# Patient Record
Sex: MALE | Race: BLACK OR AFRICAN AMERICAN | Employment: OTHER | URBAN - METROPOLITAN AREA
[De-identification: names, ages, dates, MRNs, and addresses within clinical notes are randomized per-mention and may not be internally consistent; named-entity substitution may affect disease eponyms.]

---

## 2021-07-30 ENCOUNTER — APPOINTMENT (OUTPATIENT)
Dept: GENERAL RADIOLOGY | Age: 71
End: 2021-07-30
Attending: EMERGENCY MEDICINE
Payer: OTHER GOVERNMENT

## 2021-07-30 ENCOUNTER — HOSPITAL ENCOUNTER (EMERGENCY)
Age: 71
Discharge: HOME | End: 2021-07-30
Attending: EMERGENCY MEDICINE
Payer: OTHER GOVERNMENT

## 2021-07-30 VITALS
BODY MASS INDEX: 24.52 KG/M2 | HEIGHT: 73 IN | TEMPERATURE: 98 F | RESPIRATION RATE: 18 BRPM | WEIGHT: 185 LBS | SYSTOLIC BLOOD PRESSURE: 152 MMHG | DIASTOLIC BLOOD PRESSURE: 85 MMHG | OXYGEN SATURATION: 98 % | HEART RATE: 59 BPM

## 2021-07-30 DIAGNOSIS — R07.9 CHEST PAIN, UNSPECIFIED TYPE: Primary | ICD-10-CM

## 2021-07-30 DIAGNOSIS — R25.2 MUSCLE CRAMPS: ICD-10-CM

## 2021-07-30 LAB
ALBUMIN SERPL-MCNC: 3.4 G/DL (ref 3.2–4.6)
ALBUMIN/GLOB SERPL: 0.9 {RATIO} (ref 1.2–3.5)
ALP SERPL-CCNC: 70 U/L (ref 50–136)
ALT SERPL-CCNC: 18 U/L (ref 12–65)
ANION GAP SERPL CALC-SCNC: 3 MMOL/L (ref 7–16)
AST SERPL-CCNC: 17 U/L (ref 15–37)
ATRIAL RATE: 73 BPM
BASOPHILS # BLD: 0 K/UL (ref 0–0.2)
BASOPHILS NFR BLD: 1 % (ref 0–2)
BILIRUB SERPL-MCNC: 0.5 MG/DL (ref 0.2–1.1)
BUN SERPL-MCNC: 24 MG/DL (ref 8–23)
CALCIUM SERPL-MCNC: 8.4 MG/DL (ref 8.3–10.4)
CALCULATED P AXIS, ECG09: 66 DEGREES
CALCULATED R AXIS, ECG10: -20 DEGREES
CALCULATED T AXIS, ECG11: 32 DEGREES
CHLORIDE SERPL-SCNC: 112 MMOL/L (ref 98–107)
CO2 SERPL-SCNC: 30 MMOL/L (ref 21–32)
CREAT SERPL-MCNC: 1.37 MG/DL (ref 0.8–1.5)
DIAGNOSIS, 93000: NORMAL
DIFFERENTIAL METHOD BLD: ABNORMAL
EOSINOPHIL # BLD: 0.1 K/UL (ref 0–0.8)
EOSINOPHIL NFR BLD: 1 % (ref 0.5–7.8)
ERYTHROCYTE [DISTWIDTH] IN BLOOD BY AUTOMATED COUNT: 14.5 % (ref 11.9–14.6)
GLOBULIN SER CALC-MCNC: 3.6 G/DL (ref 2.3–3.5)
GLUCOSE SERPL-MCNC: 80 MG/DL (ref 65–100)
HCT VFR BLD AUTO: 39.8 % (ref 41.1–50.3)
HGB BLD-MCNC: 12.7 G/DL (ref 13.6–17.2)
IMM GRANULOCYTES # BLD AUTO: 0 K/UL (ref 0–0.5)
IMM GRANULOCYTES NFR BLD AUTO: 0 % (ref 0–5)
LIPASE SERPL-CCNC: 120 U/L (ref 73–393)
LYMPHOCYTES # BLD: 2.1 K/UL (ref 0.5–4.6)
LYMPHOCYTES NFR BLD: 26 % (ref 13–44)
MAGNESIUM SERPL-MCNC: 2.6 MG/DL (ref 1.8–2.4)
MCH RBC QN AUTO: 29.3 PG (ref 26.1–32.9)
MCHC RBC AUTO-ENTMCNC: 31.9 G/DL (ref 31.4–35)
MCV RBC AUTO: 91.9 FL (ref 79.6–97.8)
MONOCYTES # BLD: 0.9 K/UL (ref 0.1–1.3)
MONOCYTES NFR BLD: 11 % (ref 4–12)
NEUTS SEG # BLD: 4.9 K/UL (ref 1.7–8.2)
NEUTS SEG NFR BLD: 62 % (ref 43–78)
NRBC # BLD: 0 K/UL (ref 0–0.2)
P-R INTERVAL, ECG05: 188 MS
PLATELET # BLD AUTO: 165 K/UL (ref 150–450)
PMV BLD AUTO: 11.7 FL (ref 9.4–12.3)
POTASSIUM SERPL-SCNC: 3.9 MMOL/L (ref 3.5–5.1)
PROT SERPL-MCNC: 7 G/DL (ref 6.3–8.2)
Q-T INTERVAL, ECG07: 394 MS
QRS DURATION, ECG06: 96 MS
QTC CALCULATION (BEZET), ECG08: 434 MS
RBC # BLD AUTO: 4.33 M/UL (ref 4.23–5.6)
SODIUM SERPL-SCNC: 145 MMOL/L (ref 136–145)
TROPONIN-HIGH SENSITIVITY: 6.8 PG/ML (ref 0–14)
TROPONIN-HIGH SENSITIVITY: 7 PG/ML (ref 0–14)
VENTRICULAR RATE, ECG03: 73 BPM
WBC # BLD AUTO: 7.9 K/UL (ref 4.3–11.1)

## 2021-07-30 PROCEDURE — 93005 ELECTROCARDIOGRAM TRACING: CPT | Performed by: EMERGENCY MEDICINE

## 2021-07-30 PROCEDURE — 83690 ASSAY OF LIPASE: CPT

## 2021-07-30 PROCEDURE — 71046 X-RAY EXAM CHEST 2 VIEWS: CPT

## 2021-07-30 PROCEDURE — 99284 EMERGENCY DEPT VISIT MOD MDM: CPT

## 2021-07-30 PROCEDURE — 83735 ASSAY OF MAGNESIUM: CPT

## 2021-07-30 PROCEDURE — 85025 COMPLETE CBC W/AUTO DIFF WBC: CPT

## 2021-07-30 PROCEDURE — 84484 ASSAY OF TROPONIN QUANT: CPT

## 2021-07-30 PROCEDURE — 80053 COMPREHEN METABOLIC PANEL: CPT

## 2021-07-30 RX ORDER — SODIUM CHLORIDE 0.9 % (FLUSH) 0.9 %
5-10 SYRINGE (ML) INJECTION EVERY 8 HOURS
Status: DISCONTINUED | OUTPATIENT
Start: 2021-07-30 | End: 2021-07-30 | Stop reason: HOSPADM

## 2021-07-30 RX ORDER — SODIUM CHLORIDE 0.9 % (FLUSH) 0.9 %
5-10 SYRINGE (ML) INJECTION AS NEEDED
Status: DISCONTINUED | OUTPATIENT
Start: 2021-07-30 | End: 2021-07-30 | Stop reason: HOSPADM

## 2021-07-30 NOTE — ED PROVIDER NOTES
49-year-old -American male with no underlying cardiac disease had a brief episode of chest pain yesterday morning. Discomfort lasted less than 2 minutes. He is having trouble describing the pain. It did occur twice yesterday morning. Shortly after the pain resolved he then had a sharp discomfort in his left arm associated with a cramp of his forearm and a clenched fist lasting approximately 10 minutes. This has resolved and has not reoccurred today. No nausea, vomiting, fever, cough or shortness of breath. Currently he states he feels fine. No aggravating or alleviating factors. The history is provided by the patient. Chest Pain   Pertinent negatives include no abdominal pain, no back pain, no cough, no fever, no headaches, no nausea, no shortness of breath and no vomiting. History reviewed. No pertinent past medical history. No past surgical history on file. History reviewed. No pertinent family history. Social History     Socioeconomic History    Marital status:      Spouse name: Not on file    Number of children: Not on file    Years of education: Not on file    Highest education level: Not on file   Occupational History    Not on file   Tobacco Use    Smoking status: Not on file   Substance and Sexual Activity    Alcohol use: Not on file    Drug use: Not on file    Sexual activity: Not on file   Other Topics Concern    Not on file   Social History Narrative    Not on file     Social Determinants of Health     Financial Resource Strain:     Difficulty of Paying Living Expenses:    Food Insecurity:     Worried About Running Out of Food in the Last Year:     [address] in the Last Year:    Transportation Needs:     Lack of Transportation (Medical):      Lack of Transportation (Non-Medical):    Physical Activity:     Days of Exercise per Week:     Minutes of Exercise per Session:    Stress:     Feeling of Stress :    Social Connections:     Frequency of Communication with Friends and Family:     Frequency of Social Gatherings with Friends and Family:     Attends Sikh Services:     Active Member of Clubs or Organizations:     Attends Club or Organization Meetings:     Marital Status:    Intimate Partner Violence:     Fear of Current or Ex-Partner:     Emotionally Abused:     Physically Abused:     Sexually Abused: ALLERGIES: Patient has no known allergies. Review of Systems   Constitutional: Negative for fever. HENT: Negative for congestion. Respiratory: Negative for cough and shortness of breath. Cardiovascular: Positive for chest pain. Gastrointestinal: Negative for abdominal pain, nausea and vomiting. Genitourinary: Negative for dysuria. Musculoskeletal: Negative for back pain and neck pain. Skin: Negative for rash. Neurological: Negative for headaches. Vitals:    07/30/21 1410   BP: (!) 149/85   Pulse: 75   Resp: 18   Temp: 98 °F (36.7 °C)   SpO2: 97%   Weight: 83.9 kg (185 lb)   Height: 6' 1\" (1.854 m)            Physical Exam  Vitals and nursing note reviewed. Constitutional:       General: He is not in acute distress. Appearance: Normal appearance. He is not toxic-appearing. HENT:      Head: Normocephalic and atraumatic. Nose: Nose normal.      Mouth/Throat:      Mouth: Mucous membranes are moist.      Pharynx: Oropharynx is clear. Eyes:      Conjunctiva/sclera: Conjunctivae normal.      Pupils: Pupils are equal, round, and reactive to light. Cardiovascular:      Rate and Rhythm: Normal rate and regular rhythm. Heart sounds: No murmur heard. Pulmonary:      Effort: Pulmonary effort is normal.      Breath sounds: Normal breath sounds. Abdominal:      General: There is no distension. Palpations: Abdomen is soft. Tenderness: There is no abdominal tenderness. There is no guarding. Musculoskeletal:         General: No swelling. Normal range of motion. Cervical back: Normal range of motion and neck supple. Skin:     General: Skin is warm and dry. Neurological:      General: No focal deficit present. Mental Status: He is alert and oriented to person, place, and time. Psychiatric:         Mood and Affect: Mood normal.         Behavior: Behavior normal.          MDM  Number of Diagnoses or Management Options  Diagnosis management comments: EKG shows normal sinus rhythm rate 73 no diagnostic ST changes. Lab work including 2 troponins unremarkable. Chest x-ray no acute abnormality. Has remained awake alert and comfortable. Etiology for his symptoms not clear but at this time does not appear to be an acute coronary syndrome. He appears safe for discharge home with primary care follow-up.        Amount and/or Complexity of Data Reviewed  Clinical lab tests: ordered and reviewed  Tests in the radiology section of CPT®: ordered and reviewed  Independent visualization of images, tracings, or specimens: yes    Risk of Complications, Morbidity, and/or Mortality  Presenting problems: moderate  Diagnostic procedures: moderate  Management options: moderate           EKG    Date/Time: 7/30/2021 5:11 PM  Performed by: Ronald Deluna MD  Authorized by: Ronald Deluna MD     ECG reviewed by ED Physician in the absence of a cardiologist: yes    Interpretation:     Interpretation: normal    Rate:     ECG rate assessment: normal    Rhythm:     Rhythm: sinus rhythm    Ectopy:     Ectopy: none    QRS:     QRS axis:  Normal  Conduction:     Conduction: normal    ST segments:     ST segments:  Normal  T waves:     T waves: normal

## 2021-07-30 NOTE — ED NOTES
I have reviewed discharge instructions with the patient and spouse. The patient and spouse verbalized understanding. Patient left ED via Discharge Method: ambulatory to Home with spouse    Opportunity for questions and clarification provided. Patient given 0 scripts. To continue your aftercare when you leave the hospital, you may receive an automated call from our care team to check in on how you are doing. This is a free service and part of our promise to provide the best care and service to meet your aftercare needs.  If you have questions, or wish to unsubscribe from this service please call [phone]. Thank you for Choosing our [facility] Emergency Department.

## 2021-07-30 NOTE — ED TRIAGE NOTES
Patient arrives ambulatory to triage with mask in place. Patient reports left sided chest pain radiating to left arm yesterday. Patient reports left hand cramped for 10 minutes. Denies shortness of breath. Sent from [place].

## 2022-02-18 LAB — PROSTATE SPECIFIC ANTIGEN: 4.64 NG/ML

## 2023-03-17 ENCOUNTER — OFFICE VISIT (OUTPATIENT)
Dept: UROLOGY | Age: 73
End: 2023-03-17

## 2023-03-17 DIAGNOSIS — Z80.42 FAMILY HISTORY OF PROSTATE CANCER: ICD-10-CM

## 2023-03-17 DIAGNOSIS — R39.89 URINARY PROBLEM: Primary | ICD-10-CM

## 2023-03-17 DIAGNOSIS — R97.20 ELEVATED PSA: ICD-10-CM

## 2023-03-17 DIAGNOSIS — N52.9 ERECTILE DYSFUNCTION, UNSPECIFIED ERECTILE DYSFUNCTION TYPE: ICD-10-CM

## 2023-03-17 DIAGNOSIS — N40.1 BPH WITH OBSTRUCTION/LOWER URINARY TRACT SYMPTOMS: ICD-10-CM

## 2023-03-17 DIAGNOSIS — R31.29 MICROHEMATURIA: ICD-10-CM

## 2023-03-17 DIAGNOSIS — N13.8 BPH WITH OBSTRUCTION/LOWER URINARY TRACT SYMPTOMS: ICD-10-CM

## 2023-03-17 LAB
BILIRUBIN, URINE, POC: NEGATIVE
BLOOD URINE, POC: ABNORMAL
GLUCOSE URINE, POC: NEGATIVE
KETONES, URINE, POC: NEGATIVE
LEUKOCYTE ESTERASE, URINE, POC: NEGATIVE
NITRITE, URINE, POC: NEGATIVE
PH, URINE, POC: 7 (ref 4.6–8)
PROTEIN,URINE, POC: NEGATIVE
SPECIFIC GRAVITY, URINE, POC: 1.01 (ref 1–1.03)
URINALYSIS CLARITY, POC: ABNORMAL
URINALYSIS COLOR, POC: ABNORMAL
UROBILINOGEN, POC: NORMAL

## 2023-03-17 RX ORDER — SILDENAFIL 100 MG/1
100 TABLET, FILM COATED ORAL PRN
Qty: 10 TABLET | Refills: 12 | Status: SHIPPED | OUTPATIENT
Start: 2023-03-17

## 2023-03-17 RX ORDER — AMLODIPINE BESYLATE 5 MG/1
5 TABLET ORAL DAILY
COMMUNITY

## 2023-03-17 NOTE — PROGRESS NOTES
[facility]  [address]  [address] [facility] [place] [address] [facility]  [phone]    Rodrigo Young  : 1950    Chief Complaint   Patient presents with    Elevated PSA    Hematuria        HPI     Rodrigo Young is a 74 y.o. male referred by [facility] for evaluation and treatment of hematuria and elevated PSA./ PSA  was 4.64 in -, 4.3 in . PSA up to 4.79 in    Father and brother  of prostate cancer. 2 other brothers are alive with prostate cancer. He denies gross hematuria. Has had microhematuria. He also c/o short-lived erections. He is a retired . Nonsmoker. No past medical history on file. No past surgical history on file. Current Outpatient Medications   Medication Sig Dispense Refill    amLODIPine (NORVASC) 5 MG tablet Take 5 mg by mouth daily      sildenafil (VIAGRA) 100 MG tablet Take 1 tablet by mouth as needed for Erectile Dysfunction 10 tablet 12     No current facility-administered medications for this visit. No Known Allergies  Social History     Socioeconomic History    Marital status:      Spouse name: Not on file    Number of children: Not on file    Years of education: Not on file    Highest education level: Not on file   Occupational History    Not on file   Tobacco Use    Smoking status: Not on file    Smokeless tobacco: Not on file   Substance and Sexual Activity    Alcohol use: Not on file    Drug use: Not on file    Sexual activity: Not on file   Other Topics Concern    Not on file   Social History Narrative    Not on file     Social Determinants of Health     Financial Resource Strain: Not on file   Food Insecurity: Not on file   Transportation Needs: Not on file   Physical Activity: Not on file   Stress: Not on file   Social Connections: Not on file   Intimate Partner Violence: Not on file   Housing Stability: Not on file     No family history on file. ROS    There were no vitals taken for this visit.   Physical Exam  General   Mental Status - Patient is alert and oriented X3. General Appearance - Not in acute distress. Build & Nutrition - Well nourished and Well developed. Gait - Normal.      Eye   Pupil - Bilateral - Normal, Equal and Round. Chest and Lung Exam   Auscultation:   Lung Sounds: - Normal, clear to auscultation. Cardiovascular   Cardiovascular examination reveals  - normal heart sounds, regular rate and rhythm with no murmurs. Abdomen   Palpation/Percussion: Palpation and Percussion of the abdomen reveal - No Rebound tenderness, No Rigidity (guarding), No hepatosplenomegaly and No Palpable abdominal masses. Auscultation: Auscultation of the abdomen reveals - Bowel sounds normal.      Male Genitourinary   Evaluation of genitourinary system reveals - scrotum non-tender, no masses, normal testes, no palpable masses, normal epididymides, urethral meatus normal, no discharge, normal penis and normal seminal vesicles. Rectal   Anorectal Exam: Prostate - not enlarged, no nodules . Peripheral Vascular   Lower Extremity: Inspection - Bilateral - Inspection Normal.      Neurologic   Neurologic evaluation reveals  - upper and lower extremity deep tendon reflexes intact bilaterally . Cranial Nerves: - Cranial nerves are grossly intact. Neuropsychiatric   The patient's mood and affect are described as  - normal.      Musculoskeletal  Global Assessment   Examination of related systems reveals - no generalized swelling or edema of extremities. Lymphatic  General Lymphatics   Description - No Generalized lymphadenopathy. Tenderness - Non Tender.     Urinalysis  UA - Dipstick  Results for orders placed or performed in visit on 03/17/23   AMB POC URINALYSIS DIP STICK AUTO W/O MICRO   Result Value Ref Range    Color, Urine, POC      Clarity, Urine, POC      Glucose, Urine, POC Negative Negative    Bilirubin, Urine, POC Negative Negative    Ketones, Urine, POC Negative Negative    Specific Gravity, Urine, POC 1.015 1.001 - 1.035    Blood, Urine, POC Small Negative    pH, Urine, POC 7.0 4.6 - 8.0    Protein, Urine, POC Negative Negative    Urobilinogen, POC Normal     Nitrite, Urine, POC Negative Negative    Leukocyte Esterase, Urine, POC Negative Negative       UA - Micro  WBC - 0  RBC - 0  Bacteria - 0  Epith - 0    Physical Exam    Assessment and Plan    Cheko was seen today for elevated psa and hematuria.    Diagnoses and all orders for this visit:    Urinary problem  -     AMB POC URINALYSIS DIP STICK AUTO W/O MICRO    BPH with obstruction/lower urinary tract symptoms    Erectile dysfunction, unspecified erectile dysfunction type  -     sildenafil (VIAGRA) 100 MG tablet; Take 1 tablet by mouth as needed for Erectile Dysfunction    Family history of prostate cancer    Elevated PSA  -     PSA, Diagnostic; Future    Microhematuria     Denies gross hematuria. Sounds like he has mild microhematuria.   Follow-up and Dispositions    Return in about 6 months (around 9/17/2023) for appt, PSA before.      Will give Viagra, discussed possible side effects.

## 2023-09-21 ENCOUNTER — TELEPHONE (OUTPATIENT)
Dept: UROLOGY | Age: 73
End: 2023-09-21

## 2023-09-21 ENCOUNTER — NURSE ONLY (OUTPATIENT)
Dept: UROLOGY | Age: 73
End: 2023-09-21

## 2023-09-21 DIAGNOSIS — R97.20 ELEVATED PSA: ICD-10-CM

## 2023-09-21 LAB — PSA SERPL-MCNC: 6.2 NG/ML

## 2023-09-21 NOTE — TELEPHONE ENCOUNTER
Left message cancelling appointment.  Spoke with wife and she will relay the message to call us back and reschedule appointment

## 2023-09-28 ENCOUNTER — OFFICE VISIT (OUTPATIENT)
Dept: UROLOGY | Age: 73
End: 2023-09-28
Payer: OTHER GOVERNMENT

## 2023-09-28 DIAGNOSIS — R97.20 PSA ELEVATION: Primary | ICD-10-CM

## 2023-09-28 DIAGNOSIS — R31.29 MICROHEMATURIA: ICD-10-CM

## 2023-09-28 DIAGNOSIS — Z80.42 FAMILY HX OF PROSTATE CANCER: ICD-10-CM

## 2023-09-28 DIAGNOSIS — N13.8 BPH WITH OBSTRUCTION/LOWER URINARY TRACT SYMPTOMS: ICD-10-CM

## 2023-09-28 DIAGNOSIS — N40.1 BPH WITH OBSTRUCTION/LOWER URINARY TRACT SYMPTOMS: ICD-10-CM

## 2023-09-28 LAB
BILIRUBIN, URINE, POC: NEGATIVE
BLOOD URINE, POC: NORMAL
GLUCOSE URINE, POC: NEGATIVE
KETONES, URINE, POC: NEGATIVE
LEUKOCYTE ESTERASE, URINE, POC: NEGATIVE
NITRITE, URINE, POC: NEGATIVE
PH, URINE, POC: 6 (ref 4.6–8)
PROTEIN,URINE, POC: 30
PVR, POC: 0 CC
SPECIFIC GRAVITY, URINE, POC: 1.02 (ref 1–1.03)
URINALYSIS CLARITY, POC: NORMAL
URINALYSIS COLOR, POC: NORMAL
UROBILINOGEN, POC: NORMAL

## 2023-09-28 PROCEDURE — 1123F ACP DISCUSS/DSCN MKR DOCD: CPT | Performed by: NURSE PRACTITIONER

## 2023-09-28 PROCEDURE — 99214 OFFICE O/P EST MOD 30 MIN: CPT | Performed by: NURSE PRACTITIONER

## 2023-09-28 PROCEDURE — 51798 US URINE CAPACITY MEASURE: CPT | Performed by: NURSE PRACTITIONER

## 2023-09-28 PROCEDURE — 81003 URINALYSIS AUTO W/O SCOPE: CPT | Performed by: NURSE PRACTITIONER

## 2023-09-28 ASSESSMENT — ENCOUNTER SYMPTOMS: BACK PAIN: 0

## 2023-09-28 NOTE — PROGRESS NOTES
[facility]  [address]  [phone]          Cherrie Gottron  : 1950    Chief Complaint   Patient presents with    6 Month Follow-Up    Elevated PSA          HPI     Cherrie Gottron is a 74 y.o. male referred by [place] for evaluation and treatment of hematuria and elevated PSA./ PSA  was 4.64 in 2-22, 4.3 in -. PSA up to 4.79 in   PSA 6.2 on 23. Father and brother  of prostate cancer. 2 other brothers are alive with prostate cancer. He denies gross hematuria. Has had microhematuria. LUTS include nocturia 2-3. No daytime sx. PVR is 0 cc via u/s. He is a retired . Nonsmoker. History reviewed. No pertinent past medical history. History reviewed. No pertinent surgical history. Current Outpatient Medications   Medication Sig Dispense Refill    amLODIPine (NORVASC) 5 MG tablet Take 5 mg by mouth daily      sildenafil (VIAGRA) 100 MG tablet Take 1 tablet by mouth as needed for Erectile Dysfunction 10 tablet 12     No current facility-administered medications for this visit.      No Known Allergies  Social History     Socioeconomic History    Marital status:      Spouse name: Not on file    Number of children: Not on file    Years of education: Not on file    Highest education level: Not on file   Occupational History    Not on file   Tobacco Use    Smoking status: Not on file    Smokeless tobacco: Not on file   Substance and Sexual Activity    Alcohol use: Not on file    Drug use: Not on file    Sexual activity: Not on file   Other Topics Concern    Not on file   Social History Narrative    Not on file     Social Determinants of Health     Financial Resource Strain: Not on file   Food Insecurity: Not on file   Transportation Needs: Not on file   Physical Activity: Not on file   Stress: Not on file   Social Connections: Not on file   Intimate Partner Violence: Not on file   Housing Stability: Not on file     History

## 2023-10-04 ENCOUNTER — TELEPHONE (OUTPATIENT)
Dept: UROLOGY | Age: 73
End: 2023-10-04

## 2023-10-04 NOTE — TELEPHONE ENCOUNTER
Patient called in stating that he suppose to have  a MRI done. States that no one has contacted him concerning this. Would like a stone back.  [phone]

## 2023-10-19 ENCOUNTER — HOSPITAL ENCOUNTER (OUTPATIENT)
Dept: MRI IMAGING | Age: 73
Discharge: HOME | End: 2023-10-19
Payer: OTHER GOVERNMENT

## 2023-10-19 DIAGNOSIS — R97.20 PSA ELEVATION: ICD-10-CM

## 2023-10-19 DIAGNOSIS — Z80.42 FAMILY HX OF PROSTATE CANCER: ICD-10-CM

## 2023-10-19 PROCEDURE — A9579 GAD-BASE MR CONTRAST NOS,1ML: HCPCS | Performed by: NURSE PRACTITIONER

## 2023-10-19 PROCEDURE — 72197 MRI PELVIS W/O & W/DYE: CPT

## 2023-10-19 PROCEDURE — 2580000003 HC RX 258: Performed by: NURSE PRACTITIONER

## 2023-10-19 PROCEDURE — 6360000004 HC RX CONTRAST MEDICATION: Performed by: NURSE PRACTITIONER

## 2023-10-19 RX ORDER — SODIUM CHLORIDE 0.9 % (FLUSH) 0.9 %
20 SYRINGE (ML) INJECTION AS NEEDED
Status: DISCONTINUED | OUTPATIENT
Start: 2023-10-19 | End: 2023-10-23 | Stop reason: HOSPADM

## 2023-10-19 RX ADMIN — GADOTERIDOL 17 ML: 279.3 INJECTION, SOLUTION INTRAVENOUS at 21:23

## 2023-10-19 RX ADMIN — SODIUM CHLORIDE, PRESERVATIVE FREE 20 ML: 5 INJECTION INTRAVENOUS at 21:23

## 2023-11-03 ENCOUNTER — PREP FOR PROCEDURE (OUTPATIENT)
Dept: UROLOGY | Age: 73
End: 2023-11-03

## 2023-11-03 ENCOUNTER — TELEPHONE (OUTPATIENT)
Dept: UROLOGY | Age: 73
End: 2023-11-03

## 2023-11-03 ENCOUNTER — OFFICE VISIT (OUTPATIENT)
Dept: UROLOGY | Age: 73
End: 2023-11-03
Payer: OTHER GOVERNMENT

## 2023-11-03 DIAGNOSIS — R97.20 ELEVATED PSA: ICD-10-CM

## 2023-11-03 DIAGNOSIS — Z80.42 FAMILY HX OF PROSTATE CANCER: ICD-10-CM

## 2023-11-03 DIAGNOSIS — R97.20 PSA ELEVATION: ICD-10-CM

## 2023-11-03 DIAGNOSIS — R31.29 MICROHEMATURIA: Primary | ICD-10-CM

## 2023-11-03 PROCEDURE — 99214 OFFICE O/P EST MOD 30 MIN: CPT | Performed by: UROLOGY

## 2023-11-03 PROCEDURE — 1123F ACP DISCUSS/DSCN MKR DOCD: CPT | Performed by: UROLOGY

## 2023-11-03 RX ORDER — CIPROFLOXACIN 500 MG/1
500 TABLET, FILM COATED ORAL 2 TIMES DAILY
Qty: 6 TABLET | Refills: 0 | Status: SHIPPED | OUTPATIENT
Start: 2023-11-03 | End: 2023-11-06

## 2023-11-03 ASSESSMENT — ENCOUNTER SYMPTOMS
BACK PAIN: 0
NAUSEA: 0

## 2023-11-03 NOTE — TELEPHONE ENCOUNTER
----- Message from Emily Murguia MD sent at 11/3/2023 11:32 AM EDT -----  Schedule MRI fusion transrectal prostate biopsy  Did orders  Needs Cipro and enema.

## 2023-11-03 NOTE — PROGRESS NOTES
Negative    Leukocyte Esterase, Urine, POC Negative Negative   Results for orders placed or performed in visit on 03/17/23   AMB POC URINALYSIS DIP STICK AUTO W/O MICRO   Result Value Ref Range    Color, Urine, POC      Clarity, Urine, POC      Glucose, Urine, POC Negative Negative    Bilirubin, Urine, POC Negative Negative    Ketones, Urine, POC Negative Negative    Specific Gravity, Urine, POC 1.015 1.001 - 1.035    Blood, Urine, POC Small Negative    pH, Urine, POC 7.0 4.6 - 8.0    Protein, Urine, POC Negative Negative    Urobilinogen, POC Normal     Nitrite, Urine, POC Negative Negative    Leukocyte Esterase, Urine, POC Negative Negative       UA - Micro  WBC - 0  RBC - 0  Bacteria - 0  Epith - 0    Physical Exam    Assessment and Plan    Amara Garzon was seen today for follow-up. Diagnoses and all orders for this visit:    Microhematuria  -     Cancel: AMB POC URINALYSIS DIP STICK AUTO W/O MICRO    PSA elevation  -     ciprofloxacin (CIPRO) 500 MG tablet; Take 1 tablet by mouth 2 times daily for 3 days Begin taking one day before procedure. Family hx of prostate cancer       Plan MRI fusion prostate biopsy    INFORMED CONSENT: The nature of the needle biopsy and the ultrasound and the  purpose was discussed with the patient. Risks include, but are not limited tobleeding into the urethra, bladder or rectum, infection, local pain, difficulty voiding and urinary retention requiring catheterization, inability to get an adequate amount of specimen for diagnosis, false positives and negatives, the need for further testing or open procedures based on biopsy results, and side effects from local anesthesia. The benefits are judged to outweigh the risks, should any of these complications occur, and no guarantees of success or outcome were given or implied. He states he has no further questions and agreed to proceed. Follow-up and Dispositions    Return for call Africa Luciano or Zachary Rizvi to schedule surgery.

## 2023-11-07 PROBLEM — R97.20 ELEVATED PSA: Status: ACTIVE | Noted: 2023-11-03

## 2023-11-07 NOTE — TELEPHONE ENCOUNTER
Procedures: Procedure(s):   MRI FUSION PROSTATE BIOPSY   Date: 12/21/2023   Time: 1033   Location: [facility] MAIN OR 02     Scheduled.

## 2024-01-05 ENCOUNTER — OFFICE VISIT (OUTPATIENT)
Dept: UROLOGY | Age: 74
End: 2024-01-05
Payer: OTHER GOVERNMENT

## 2024-01-05 DIAGNOSIS — R97.20 ELEVATED PSA: Primary | ICD-10-CM

## 2024-01-05 DIAGNOSIS — C61 PROSTATE CANCER (HCC): ICD-10-CM

## 2024-01-05 DIAGNOSIS — Z80.42 FAMILY HX OF PROSTATE CANCER: ICD-10-CM

## 2024-01-05 PROCEDURE — 1123F ACP DISCUSS/DSCN MKR DOCD: CPT | Performed by: UROLOGY

## 2024-01-05 PROCEDURE — 99214 OFFICE O/P EST MOD 30 MIN: CPT | Performed by: UROLOGY

## 2024-01-05 ASSESSMENT — ENCOUNTER SYMPTOMS: BACK PAIN: 0

## 2024-04-02 ENCOUNTER — NURSE ONLY (OUTPATIENT)
Dept: UROLOGY | Age: 74
End: 2024-04-02

## 2024-04-02 DIAGNOSIS — C61 PROSTATE CANCER (HCC): ICD-10-CM

## 2024-04-02 LAB — PSA SERPL-MCNC: 5 NG/ML

## 2024-04-08 ENCOUNTER — OFFICE VISIT (OUTPATIENT)
Dept: UROLOGY | Age: 74
End: 2024-04-08
Payer: OTHER GOVERNMENT

## 2024-04-08 DIAGNOSIS — N13.8 BPH WITH OBSTRUCTION/LOWER URINARY TRACT SYMPTOMS: ICD-10-CM

## 2024-04-08 DIAGNOSIS — R97.20 ELEVATED PSA: ICD-10-CM

## 2024-04-08 DIAGNOSIS — N40.1 BPH WITH OBSTRUCTION/LOWER URINARY TRACT SYMPTOMS: ICD-10-CM

## 2024-04-08 DIAGNOSIS — C61 PROSTATE CANCER (HCC): Primary | ICD-10-CM

## 2024-04-08 LAB
BILIRUBIN, URINE, POC: NEGATIVE
BLOOD URINE, POC: NORMAL
GLUCOSE URINE, POC: NEGATIVE
KETONES, URINE, POC: NEGATIVE
LEUKOCYTE ESTERASE, URINE, POC: NEGATIVE
NITRITE, URINE, POC: NEGATIVE
PH, URINE, POC: 6 (ref 4.6–8)
PROTEIN,URINE, POC: 30
SPECIFIC GRAVITY, URINE, POC: 1.02 (ref 1–1.03)
URINALYSIS CLARITY, POC: NORMAL
URINALYSIS COLOR, POC: NORMAL
UROBILINOGEN, POC: NORMAL

## 2024-04-08 PROCEDURE — 1123F ACP DISCUSS/DSCN MKR DOCD: CPT | Performed by: UROLOGY

## 2024-04-08 PROCEDURE — 99213 OFFICE O/P EST LOW 20 MIN: CPT | Performed by: UROLOGY

## 2024-04-08 PROCEDURE — 81003 URINALYSIS AUTO W/O SCOPE: CPT | Performed by: UROLOGY

## 2024-04-08 ASSESSMENT — ENCOUNTER SYMPTOMS
BACK PAIN: 0
NAUSEA: 0

## 2024-04-08 NOTE — PROGRESS NOTES
risk of permanent incontinence requiring pads, and 2-3% risk of severe incontinence in patients undergoing open or robotic radical prostatectomy.  He is healthy, practices veganism.   Has strong family history of prostate cancer and breast cancer. May benefit from genetic testing.   Plan active surveillance. Discussed possible confirmatory biopsy. Will follow PSA for now.   PSA 5.0 in 4-24.    Past Medical History:   Diagnosis Date    Hypertension      Past Surgical History:   Procedure Laterality Date    COLONOSCOPY      PROSTATE BIOPSY N/A 12/21/2023    MRI FUSION PROSTATE BIOPSY performed by Jake Porter Jr., MD at [facility] MAIN OR     Current Outpatient Medications   Medication Sig Dispense Refill    Cholecalciferol (VITAMIN D-3 PO) Take by mouth      Cyanocobalamin (VITAMIN B-12 PO) Take by mouth       No current facility-administered medications for this visit.     No Known Allergies  Social History     Socioeconomic History    Marital status:      Spouse name: Not on file    Number of children: Not on file    Years of education: Not on file    Highest education level: Not on file   Occupational History    Not on file   Tobacco Use    Smoking status: Former     Types: Cigarettes    Smokeless tobacco: Not on file   Substance and Sexual Activity    Alcohol use: Never    Drug use: Never    Sexual activity: Not on file   Other Topics Concern    Not on file   Social History Narrative    Not on file     Social Determinants of Health     Financial Resource Strain: Not on file   Food Insecurity: Not on file   Transportation Needs: Not on file   Physical Activity: Not on file   Stress: Not on file   Social Connections: Not on file   Intimate Partner Violence: Not on file   Housing Stability: Not on file     No family history on file.    Review of Systems  Constitutional:   Negative for fever.  GI:  Negative for nausea.  Musculoskeletal:  Negative for back pain.      There were no vitals taken for this

## 2024-08-01 ENCOUNTER — EVALUATION (OUTPATIENT)
Age: 74
End: 2024-08-01
Payer: OTHER GOVERNMENT

## 2024-08-01 ENCOUNTER — OFFICE VISIT (OUTPATIENT)
Age: 74
End: 2024-08-01
Payer: OTHER GOVERNMENT

## 2024-08-01 VITALS — BODY MASS INDEX: 25.18 KG/M2 | HEIGHT: 73 IN | WEIGHT: 190 LBS

## 2024-08-01 DIAGNOSIS — M79.89 SWELLING OF FINGER, LEFT: ICD-10-CM

## 2024-08-01 DIAGNOSIS — R29.898 DECREASED GRIP STRENGTH: Primary | ICD-10-CM

## 2024-08-01 DIAGNOSIS — M25.642 STIFFNESS OF LEFT HAND JOINT: ICD-10-CM

## 2024-08-01 DIAGNOSIS — M20.012 MALLET DEFORMITY OF LEFT RING FINGER: Primary | ICD-10-CM

## 2024-08-01 DIAGNOSIS — M79.645 FINGER PAIN, LEFT: ICD-10-CM

## 2024-08-01 PROCEDURE — 99203 OFFICE O/P NEW LOW 30 MIN: CPT | Performed by: ORTHOPAEDIC SURGERY

## 2024-08-01 PROCEDURE — 97110 THERAPEUTIC EXERCISES: CPT | Performed by: OCCUPATIONAL THERAPIST

## 2024-08-01 PROCEDURE — L3933 FO W/O JOINTS CF: HCPCS | Performed by: OCCUPATIONAL THERAPIST

## 2024-08-01 PROCEDURE — 97165 OT EVAL LOW COMPLEX 30 MIN: CPT | Performed by: OCCUPATIONAL THERAPIST

## 2024-08-01 PROCEDURE — 1123F ACP DISCUSS/DSCN MKR DOCD: CPT | Performed by: ORTHOPAEDIC SURGERY

## 2024-08-01 NOTE — PROGRESS NOTES
Fabrication/adjustments as indicated  (67950) Subsequent orthotic management as indicated  Modalities prn to address pain, spasms, and swelling: (80773) Hot/cold pack  (94230) Fluidotherapy  (66465) Paraffin  Home exercise program (HEP) development    The referring medical provider has reviewed and approved this evaluation and plan of care as noted by the electronic signature attached to note.    GOALS     Short term goals:  (4 weeks, 9/13/2024  (6 weeks))  Patient will be I with HEP and precautions.  Patient to demonstrate independence with donning/doffing orthosis in one visit, Patient to verbalize understanding of inspecting skin to prevent pressure areas and allergic reaction due to orthosis, Patient to verbalize understanding of precautions and necessity of wearing orthosis as indicated by therapist, Patient to verbalize understanding of wound/pin care in one visit, and Patient to demonstrate independence with HEP in one visit  The patient will have full MP flexion and extension of the affected digit in order to maximize functional use of hand with ADL's.  The patient will have full PIP extension and flexion of the affected digit in order to maximize functional use of hand with ADL's.  The patient will maintain precautions of full DIP extension in affected digit at all times for 6 weeks.  Improve Quick DASH functional assessment score from less than 10% deficit.    Long term goals: (12 weeks, 10/25/2024  (12 weeks) )  Pt will be able to use the affected UE for all ADL's without difficulty.  Increase active digit flexion of DIP joint in affected digit to 45° to improve ability to grasp.  Prevent DIP extension lag in affected digit no greater than 10°    Decrease edema in affected digit to minimal to improve motion.  Pt will have adequate strength to be able to hold and open containers without difficulty.  Pt will be able to make a full composite fist with the involved hand to enable to grasp and hold

## 2024-08-05 DIAGNOSIS — M20.012 MALLET DEFORMITY OF LEFT RING FINGER: Primary | ICD-10-CM

## 2024-08-05 NOTE — PROGRESS NOTES
Orthopaedic Hand Clinic Note    Name: Cheko Patel  YOB: 1950  Gender: male  MRN: [id number]      CC: Patient referred for evaluation of upper extremity pain    HPI: Cheko Patel is a 74 y.o. male with a chief complaint of injury to his left ring finger which occurred about a month ago when he was cleaning a spot on the carpet and the finger got stuck.  He heard a pop.  He did have x-rays obtained.  He has been wearing a splint but has been taking it off to bathe.  He has a history of remote injury to the left small finger which was never properly treated      ROS/Meds/PSH/PMH/FH/SH: I personally reviewed the patients standard intake form.  Pertinents are discussed in the HPI    Physical Examination:    Musculoskeletal Exam:  Examination on the left upper extremity demonstrates cap refill < 5 seconds in all fingers, skin is intact, there is minimal tenderness to palpation at the ring finger distal interphalangeal joint.  There is about a 40 degree extension lag at the ring distal interphalangeal joint.  He is able to fire FDS and FDP..    Imaging / Electrodiagnostic Tests:     Finger XR: AP, Lateral, Oblique views     Clinical Indication:  1. Mallet deformity of left ring finger           Report: AP, lateral, oblique x-ray of the left ring finger demonstrates no acute fracture or dislocation    Impression: as above     Zainab Maciel MD         Assessment:     ICD-10-CM    1. Mallet deformity of left ring finger  M20.012           Plan:   We discussed the diagnosis and different treatment options. We discussed observation, therapy, antiinflammatory medications and other pertinent treatment modalities.    After discussing in detail the patient elects to proceed with referral to OT for placement in a custom mallet splint.  I explained that the splint must remain in place essentially at all times for the next 6 weeks, followed by a slow weaning process.  I explained how to properly don and doff the

## 2024-08-12 ENCOUNTER — TREATMENT (OUTPATIENT)
Age: 74
End: 2024-08-12
Payer: OTHER GOVERNMENT

## 2024-08-12 DIAGNOSIS — M79.645 FINGER PAIN, LEFT: ICD-10-CM

## 2024-08-12 DIAGNOSIS — R29.898 DECREASED GRIP STRENGTH: Primary | ICD-10-CM

## 2024-08-12 DIAGNOSIS — M25.642 STIFFNESS OF LEFT HAND JOINT: ICD-10-CM

## 2024-08-12 DIAGNOSIS — M79.89 SWELLING OF FINGER, LEFT: ICD-10-CM

## 2024-08-12 PROCEDURE — 97763 ORTHC/PROSTC MGMT SBSQ ENC: CPT | Performed by: OCCUPATIONAL THERAPIST

## 2024-08-12 NOTE — PROGRESS NOTES
[facility]  [address] [place] [id number]  Dept: [phone]      Occupational Therapy Daily Note      Referring MD: Friend, Zainab VIEIRA MD    Diagnosis:    Diagnosis Orders   1. Decreased  strength        2. Swelling of finger, left        3. Stiffness of left hand joint        4. Finger pain, left               Therapy precautions:  mallet precautions     Payor: Payor: [employer] /  /  /  Billing pattern: [facility]- total time  Total Direct Treatment Time: 15 min                       Total In Office Time: 20 min   Modifier needed: No  Episode visit count:  2     Preferred Name:  Cheko     SUBJECTIVE     Pt reports that he feels like his splint has gotten somewhat looser.     OBJECTIVE         A/PROM MEASUREMENTS        Digital ROM  (assessed while protected in splint) involved digit   AROM    MCP    AROM      PIP    AROM      DIP N/A     16° lag at time of initial visit prior to orthotic fabrication      TREATMENT    Orthotic Management and Training Subsequent Encounter (25981) x 15 minutes: subsequent encounter for modifications, fitting adjustments, and additional training  Proximal portion of orthosis adjusted/remolded due to decreased swelling     CLINICAL DECISION MAKING/ASSESSMENT     Pt maintaining excellent DIP extension. Will follow-up next week to ensure extension and proper orthosis fit.       PLAN OF CARE     Monitor orthosis fit   Mallet protocol     GOALS     Short term goals:  (4 weeks, 9/13/2024  (6 weeks))  Patient will be I with HEP and precautions.  Patient to demonstrate independence with donning/doffing orthosis in one visit, Patient to verbalize understanding of inspecting skin to prevent pressure areas and allergic reaction due to orthosis, Patient to verbalize understanding of precautions and necessity of wearing orthosis as indicated by therapist, Patient to verbalize understanding of wound/pin care in one visit, and Patient to demonstrate independence

## 2024-08-19 ENCOUNTER — TREATMENT (OUTPATIENT)
Age: 74
End: 2024-08-19
Payer: OTHER GOVERNMENT

## 2024-08-19 DIAGNOSIS — R29.898 DECREASED GRIP STRENGTH: Primary | ICD-10-CM

## 2024-08-19 DIAGNOSIS — M25.642 STIFFNESS OF LEFT HAND JOINT: ICD-10-CM

## 2024-08-19 DIAGNOSIS — M79.89 SWELLING OF FINGER, LEFT: ICD-10-CM

## 2024-08-19 PROCEDURE — 97763 ORTHC/PROSTC MGMT SBSQ ENC: CPT | Performed by: OCCUPATIONAL THERAPIST

## 2024-08-19 NOTE — PROGRESS NOTES
[facility]  [address] [place] [id number]  Dept: [phone]      Occupational Therapy Daily Note      Referring MD: Friend, Zainab VIEIRA MD    Diagnosis:    Diagnosis Orders   1. Decreased  strength        2. Stiffness of left hand joint        3. Swelling of finger, left               Therapy precautions:  mallet precautions     Payor: Payor: MARIA OPTUM /  /  /  Billing pattern: [facility]- total time  Total Direct Treatment Time: 10 min                       Total In Office Time: 15 min   Modifier needed: No  Episode visit count:  3     Preferred Name:  Cheko     SUBJECTIVE     Pt reports his splint has been feeling somewhat loose. He felt pressure over the dorsal aspect of the finger a few days ago but has not felt it since. Overall feeling comfortable in his splint.     OBJECTIVE         A/PROM MEASUREMENTS        Digital ROM  (assessed while protected in splint) involved digit   AROM    MCP    AROM      PIP    AROM      DIP N/A     16° lag at time of initial visit prior to orthotic fabrication      TREATMENT    Orthotic Management and Training Subsequent Encounter ([id number]) x 10 minutes: subsequent encounter for modifications, fitting adjustments, and additional training  Review of wear of orthosis and mallet protocol   Skin checked and cleaned      CLINICAL DECISION MAKING/ASSESSMENT     Pt maintaining excellent Dip extension in his orthosis. Slight skin maceration noted. Education on cleaning skin daily in safe position. Pt will follow-up in two weeks.     PLAN OF CARE     Monitor orthosis fit   Mallet protocol     GOALS     Short term goals:  (4 weeks, 9/13/2024  (6 weeks))  Patient will be I with HEP and precautions.  Patient to demonstrate independence with donning/doffing orthosis in one visit, Patient to verbalize understanding of inspecting skin to prevent pressure areas and allergic reaction due to orthosis, Patient to verbalize understanding of precautions and

## 2024-08-29 ENCOUNTER — HOSPITAL ENCOUNTER (OUTPATIENT)
Age: 74
Discharge: HOME | End: 2024-08-31
Payer: OTHER GOVERNMENT

## 2024-08-29 DIAGNOSIS — M25.561 RIGHT KNEE PAIN, UNSPECIFIED CHRONICITY: ICD-10-CM

## 2024-08-29 PROCEDURE — 73721 MRI JNT OF LWR EXTRE W/O DYE: CPT

## 2024-09-06 ENCOUNTER — TREATMENT (OUTPATIENT)
Age: 74
End: 2024-09-06

## 2024-09-06 DIAGNOSIS — M79.89 SWELLING OF FINGER, LEFT: ICD-10-CM

## 2024-09-06 DIAGNOSIS — M79.645 FINGER PAIN, LEFT: ICD-10-CM

## 2024-09-06 DIAGNOSIS — M25.642 STIFFNESS OF LEFT HAND JOINT: ICD-10-CM

## 2024-09-06 DIAGNOSIS — R29.898 DECREASED GRIP STRENGTH: Primary | ICD-10-CM

## 2024-09-06 NOTE — PROGRESS NOTES
[facility]  [address] [place] [id number]  Dept: [phone]      Occupational Therapy Daily Note      Referring MD: Zainab Maciel MD    Diagnosis:    Diagnosis Orders   1. Decreased  strength        2. Stiffness of left hand joint        3. Swelling of finger, left        4. Finger pain, left               Therapy precautions:  mallet precautions     Payor: Payor: MARIA OPTUM /  /  /  Billing pattern: [facility]- total time  Total Direct Treatment Time: 10 min                       Total In Office Time: 15 min   Modifier needed: No  Episode visit count:  4     Preferred Name:  Cheko     SUBJECTIVE     Pt reports he has been compliant with wear of his orthosis.     OBJECTIVE     A/PROM MEASUREMENTS    Digital ROM  (assessed while protected in splint) involved digit   AROM    MCP    AROM      PIP    AROM      DIP N/A     16° lag at time of initial visit prior to orthotic fabrication      TREATMENT    Orthotic Management and Training Subsequent Encounter (90105) x 10 minutes: subsequent encounter for modifications, fitting adjustments, and additional training  Review of wear of orthosis and mallet protocol   Skin checked and cleaned    CLINICAL DECISION MAKING/ASSESSMENT     Pt maintaining excellent extension of the DIP. Pt will follow-up next week with Dr. Maciel and we will potentially begin to wean from orthosis at that time.     PLAN OF CARE     Wean from orthosis starting next week     GOALS     Short term goals:  (4 weeks, 9/13/2024  (6 weeks))  Patient will be I with HEP and precautions.  Patient to demonstrate independence with donning/doffing orthosis in one visit, Patient to verbalize understanding of inspecting skin to prevent pressure areas and allergic reaction due to orthosis, Patient to verbalize understanding of precautions and necessity of wearing orthosis as indicated by therapist, Patient to verbalize understanding of wound/pin care in one visit, and Patient

## 2024-09-12 ENCOUNTER — TREATMENT (OUTPATIENT)
Age: 74
End: 2024-09-12
Payer: OTHER GOVERNMENT

## 2024-09-12 ENCOUNTER — OFFICE VISIT (OUTPATIENT)
Age: 74
End: 2024-09-12
Payer: OTHER GOVERNMENT

## 2024-09-12 DIAGNOSIS — M79.89 SWELLING OF FINGER, LEFT: ICD-10-CM

## 2024-09-12 DIAGNOSIS — R29.898 DECREASED GRIP STRENGTH: Primary | ICD-10-CM

## 2024-09-12 DIAGNOSIS — M20.012 MALLET DEFORMITY OF LEFT RING FINGER: Primary | ICD-10-CM

## 2024-09-12 DIAGNOSIS — M25.642 STIFFNESS OF LEFT HAND JOINT: ICD-10-CM

## 2024-09-12 PROCEDURE — 1123F ACP DISCUSS/DSCN MKR DOCD: CPT | Performed by: ORTHOPAEDIC SURGERY

## 2024-09-12 PROCEDURE — 97110 THERAPEUTIC EXERCISES: CPT | Performed by: OCCUPATIONAL THERAPIST

## 2024-09-12 PROCEDURE — 99213 OFFICE O/P EST LOW 20 MIN: CPT | Performed by: ORTHOPAEDIC SURGERY

## 2024-09-25 ENCOUNTER — TREATMENT (OUTPATIENT)
Age: 74
End: 2024-09-25
Payer: OTHER GOVERNMENT

## 2024-09-25 DIAGNOSIS — R29.898 DECREASED GRIP STRENGTH: Primary | ICD-10-CM

## 2024-09-25 DIAGNOSIS — M25.642 STIFFNESS OF LEFT HAND JOINT: ICD-10-CM

## 2024-09-25 DIAGNOSIS — M79.645 FINGER PAIN, LEFT: ICD-10-CM

## 2024-09-25 DIAGNOSIS — M79.89 SWELLING OF FINGER, LEFT: ICD-10-CM

## 2024-09-25 PROCEDURE — 97110 THERAPEUTIC EXERCISES: CPT | Performed by: OCCUPATIONAL THERAPIST

## 2024-10-08 ENCOUNTER — LAB (OUTPATIENT)
Dept: UROLOGY | Age: 74
End: 2024-10-08

## 2024-10-08 DIAGNOSIS — C61 PROSTATE CANCER (HCC): ICD-10-CM

## 2024-10-08 LAB — PSA SERPL-MCNC: 5.8 NG/ML (ref 0–4)

## 2024-10-14 ENCOUNTER — OFFICE VISIT (OUTPATIENT)
Dept: UROLOGY | Age: 74
End: 2024-10-14
Payer: OTHER GOVERNMENT

## 2024-10-14 DIAGNOSIS — N13.8 BPH WITH OBSTRUCTION/LOWER URINARY TRACT SYMPTOMS: ICD-10-CM

## 2024-10-14 DIAGNOSIS — R97.20 ELEVATED PSA: ICD-10-CM

## 2024-10-14 DIAGNOSIS — Z80.42 FAMILY HX OF PROSTATE CANCER: ICD-10-CM

## 2024-10-14 DIAGNOSIS — N40.1 BPH WITH OBSTRUCTION/LOWER URINARY TRACT SYMPTOMS: ICD-10-CM

## 2024-10-14 DIAGNOSIS — C61 PROSTATE CANCER (HCC): Primary | ICD-10-CM

## 2024-10-14 DIAGNOSIS — N52.9 ERECTILE DYSFUNCTION, UNSPECIFIED ERECTILE DYSFUNCTION TYPE: ICD-10-CM

## 2024-10-14 LAB
BILIRUBIN, URINE, POC: NEGATIVE
BLOOD URINE, POC: NORMAL
GLUCOSE URINE, POC: NEGATIVE MG/DL
KETONES, URINE, POC: NEGATIVE MG/DL
LEUKOCYTE ESTERASE, URINE, POC: NEGATIVE
NITRITE, URINE, POC: NEGATIVE
PH, URINE, POC: 7 (ref 4.6–8)
PROTEIN,URINE, POC: 30 MG/DL
SPECIFIC GRAVITY, URINE, POC: 1.02 (ref 1–1.03)
URINALYSIS CLARITY, POC: NORMAL
URINALYSIS COLOR, POC: NORMAL
UROBILINOGEN, POC: NORMAL MG/DL

## 2024-10-14 PROCEDURE — 81003 URINALYSIS AUTO W/O SCOPE: CPT | Performed by: UROLOGY

## 2024-10-14 PROCEDURE — 1123F ACP DISCUSS/DSCN MKR DOCD: CPT | Performed by: UROLOGY

## 2024-10-14 PROCEDURE — 99213 OFFICE O/P EST LOW 20 MIN: CPT | Performed by: UROLOGY

## 2024-10-14 ASSESSMENT — ENCOUNTER SYMPTOMS
NAUSEA: 0
BACK PAIN: 0

## 2024-10-14 NOTE — PROGRESS NOTES
CHRONIC PROSTATITIS.          F:  \"PROSTATE, LEFT BASE, BIOPSY\":              PROSTATIC ADENOCARCINOMA, ESE SCORE 3 + 3 = 6 (GRADE GROUP   1),                  INVOLVING APPROXIMATELY 5% OF SPECIMEN.          G:  \"PROSTATE, LEFT MID, BIOPSY\":              PROSTATIC ADENOCARCINOMA, ESE SCORE 3 + 3 = 6 (GRADE GROUP   1),                  INVOLVING APPROXIMATELY 40% OF SPECIMEN WITH PERINEURAL        INVASION.           H:  \"PROSTATE, LEFT APEX, BIOPSY\":              BENIGN PROSTATE TISSUE, CHRONIC PROSTATITIS.          I:  \"PROSTATE, RIGHT BASE, BIOPSY\":              ATYPICAL SMALL ACINAR PROLIFERATION SUSPICIOUS FOR BUT NOT                  DIAGNOSTIC OF CARCINOMA.          J:  \"PROSTATE, RIGHT MID, BIOPSY\":             BENIGN PROSTATE TISSUE, CHRONIC PROSTATITIS.           K:  \"PROSTATE, RIGHT APEX, BIOPSY\":              BENIGN PROSTATE TISSUE, CHRONIC PROSTATITIS.          L:  \"PROSTATE, RIGHT LATERAL BASE, BIOPSY\":              BENIGN PROSTATE TISSUE, CHRONIC PROSTATITIS.          M:  \"PROSTATE, RIGHT LATERAL MID, BIOPSY\":              PROSTATIC ADENOCARCINOMA, ESE SCORE 3 + 3 = 6 (GRADE GROUP   1),                  INVOLVING APPROXIMATELY 10% OF SPECIMEN.          N:  \"PROSTATE, RIGHT LATERAL APEX, BIOPSY\":              FOCUS OF HIGH GRADE PROSTATIC INTRAEPITHELIAL NEOPLASIA.   ?????Stage T1c grade 3+3 prostate cancer.   Pathology results from the biopsy and all available treatment options were reviewed in detail with the patient today.  Treatments options discussed but not limited to included surgery (open, perineal and robotic assisted laparoscopic approaches), external beam radiation, brachytherapy, proton beam therapy, cryosurgery, HIFU, androgen deprivation therapy and watchful waiting including active surveillance.  I discussed all risks, possible side effects and benefits associated with the above treatment options.  I specifically discussed the 30-40% risk of permanent ED in men without

## 2024-11-22 ENCOUNTER — OFFICE VISIT (OUTPATIENT)
Dept: UROLOGY | Age: 74
End: 2024-11-22
Payer: OTHER GOVERNMENT

## 2024-11-22 DIAGNOSIS — N45.3 EPIDIDYMOORCHITIS: Primary | ICD-10-CM

## 2024-11-22 DIAGNOSIS — C61 PROSTATE CANCER (HCC): ICD-10-CM

## 2024-11-22 DIAGNOSIS — N45.3 EPIDIDYMOORCHITIS: ICD-10-CM

## 2024-11-22 LAB
BILIRUBIN, URINE, POC: NEGATIVE
BLOOD URINE, POC: NORMAL
GLUCOSE URINE, POC: NEGATIVE MG/DL
KETONES, URINE, POC: NORMAL MG/DL
LEUKOCYTE ESTERASE, URINE, POC: NEGATIVE
NITRITE, URINE, POC: NEGATIVE
PH, URINE, POC: 6 (ref 4.6–8)
PROTEIN,URINE, POC: NEGATIVE MG/DL
SPECIFIC GRAVITY, URINE, POC: 1.03 (ref 1–1.03)
URINALYSIS CLARITY, POC: NORMAL
URINALYSIS COLOR, POC: NORMAL
UROBILINOGEN, POC: NORMAL MG/DL

## 2024-11-22 PROCEDURE — 99214 OFFICE O/P EST MOD 30 MIN: CPT | Performed by: NURSE PRACTITIONER

## 2024-11-22 PROCEDURE — 81003 URINALYSIS AUTO W/O SCOPE: CPT | Performed by: NURSE PRACTITIONER

## 2024-11-22 PROCEDURE — 1123F ACP DISCUSS/DSCN MKR DOCD: CPT | Performed by: NURSE PRACTITIONER

## 2024-11-22 RX ORDER — MELOXICAM 7.5 MG/1
7.5 TABLET ORAL DAILY
Qty: 30 TABLET | Refills: 0 | Status: SHIPPED | OUTPATIENT
Start: 2024-11-22

## 2024-11-22 RX ORDER — DOXYCYCLINE HYCLATE 100 MG
100 TABLET ORAL 2 TIMES DAILY
Qty: 28 TABLET | Refills: 0 | Status: SHIPPED | OUTPATIENT
Start: 2024-11-22 | End: 2024-12-06

## 2024-11-22 ASSESSMENT — ENCOUNTER SYMPTOMS: BACK PAIN: 0

## 2024-11-22 NOTE — PROGRESS NOTES
[facility]  [address] [address]  [phone]          Cheko Patel  : 1950    Chief Complaint   Patient presents with    Follow-up    Elevated PSA     Testicle pain with infection          HPI     Cheko Patel is a 74 y.o. male w hx of prostate cancer followed by Dr. Porter.     He went to [facility] 24 w c/o right testicle pain and swelling. MAYNOR showed mod right hydrocele and right epididymoorchitis. Cr 1.15. He was started on Cipro.     Here today for fu. He has completed Cipro, however sx are persisting. Testicle is still quite tender. He is afebrile. No concern for STI.           Past Medical History:   Diagnosis Date    Hypertension      Past Surgical History:   Procedure Laterality Date    COLONOSCOPY      PROSTATE BIOPSY N/A 2023    MRI FUSION PROSTATE BIOPSY performed by Jake Porter Jr., MD at [facility] MAIN OR     Current Outpatient Medications   Medication Sig Dispense Refill    meloxicam (MOBIC) 7.5 MG tablet Take 1 tablet by mouth daily 30 tablet 0    doxycycline hyclate (VIBRA-TABS) 100 MG tablet Take 1 tablet by mouth 2 times daily for 14 days 28 tablet 0    Cholecalciferol (VITAMIN D-3 PO) Take by mouth      Cyanocobalamin (VITAMIN B-12 PO) Take by mouth       No current facility-administered medications for this visit.     No Known Allergies  Social History     Socioeconomic History    Marital status:      Spouse name: Not on file    Number of children: Not on file    Years of education: Not on file    Highest education level: Not on file   Occupational History    Not on file   Tobacco Use    Smoking status: Former     Types: Cigarettes    Smokeless tobacco: Never    Tobacco comments:     Smoked in 60's.    Substance and Sexual Activity    Alcohol use: Never    Drug use: Never    Sexual activity: Not on file   Other Topics Concern    Not on file   Social History Narrative    Not on file     Social Determinants of Health

## 2024-11-25 ENCOUNTER — TELEPHONE (OUTPATIENT)
Dept: UROLOGY | Age: 74
End: 2024-11-25

## 2024-11-25 LAB
BACTERIA SPEC CULT: NORMAL
SERVICE CMNT-IMP: NORMAL

## 2024-12-02 ENCOUNTER — OFFICE VISIT (OUTPATIENT)
Dept: ORTHOPEDIC SURGERY | Age: 74
End: 2024-12-02
Payer: OTHER GOVERNMENT

## 2024-12-02 VITALS — HEIGHT: 73 IN | WEIGHT: 205 LBS | BODY MASS INDEX: 27.17 KG/M2

## 2024-12-02 DIAGNOSIS — M17.12 PRIMARY OSTEOARTHRITIS OF LEFT KNEE: ICD-10-CM

## 2024-12-02 DIAGNOSIS — M25.561 RIGHT KNEE PAIN, UNSPECIFIED CHRONICITY: Primary | ICD-10-CM

## 2024-12-02 DIAGNOSIS — M17.11 PRIMARY OSTEOARTHRITIS OF RIGHT KNEE: ICD-10-CM

## 2024-12-02 PROCEDURE — 99205 OFFICE O/P NEW HI 60 MIN: CPT | Performed by: ORTHOPAEDIC SURGERY

## 2024-12-02 PROCEDURE — 1123F ACP DISCUSS/DSCN MKR DOCD: CPT | Performed by: ORTHOPAEDIC SURGERY

## 2024-12-02 NOTE — PROGRESS NOTES
Name: Cheko Patel  YOB: 1950  Gender: male  MRN: [id number]    CC: Right knee pain much worse than left    HPI: Cheko Patel is a 74 y.o. male who presents with longstanding issues with his right knee much worse than left.  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  He had an open arthrotomy in 1970 and a meniscectomy at that time per his report.  He has generally been active since his recovery from that but has had trouble with his knees for years.  He notes progressive deformity bilaterally worse in the right and the left.  He has trouble getting up and down down a sense of insecurity catching giving way with the knee.  It is painful with activity has significant issues with episodic swelling.    Much of his care has been provided at the [facility].  He has had braces provided as well as injection therapies.  He does use a cane much of the time because of insecurity and concerns regarding his right knee.  His left knee gives him similar but less severe symptoms currently.  He also takes meloxicam with at this time relatively minimal benefit for his knees.    He has seen [facility] for prostate concerns and is currently stable there.  He has otherwise good well-maintained health.    History was obtained from patient and aside from his  service he is a now retired .    ROS/Meds/PSH/PMH/FH/SH: I personally reviewed the patients standard intake form.  Below are the pertinents    Tobacco:  reports that he has quit smoking. His smoking use included cigarettes. He has never used smokeless tobacco.  Past Medical History:   Diagnosis Date    Hypertension       Past Surgical History:   Procedure Laterality Date    COLONOSCOPY      PROSTATE BIOPSY N/A 12/21/2023    MRI FUSION PROSTATE BIOPSY performed by Jake Porter Jr., MD at [facility] MAIN OR        Physical Examination:  Physical exam: On examination of the patient's gait there is there is varus deformity in

## 2024-12-04 ENCOUNTER — TELEPHONE (OUTPATIENT)
Dept: ORTHOPEDIC SURGERY | Age: 74
End: 2024-12-04

## 2025-04-07 ENCOUNTER — LAB (OUTPATIENT)
Dept: UROLOGY | Age: 75
End: 2025-04-07

## 2025-04-07 DIAGNOSIS — C61 PROSTATE CANCER (HCC): ICD-10-CM

## 2025-04-07 LAB — PSA SERPL-MCNC: 5.9 NG/ML (ref 0–4)

## 2025-04-14 ENCOUNTER — OFFICE VISIT (OUTPATIENT)
Dept: UROLOGY | Age: 75
End: 2025-04-14
Payer: OTHER GOVERNMENT

## 2025-04-14 DIAGNOSIS — C61 PROSTATE CANCER (HCC): ICD-10-CM

## 2025-04-14 DIAGNOSIS — N13.8 BPH WITH OBSTRUCTION/LOWER URINARY TRACT SYMPTOMS: ICD-10-CM

## 2025-04-14 DIAGNOSIS — N40.1 BPH WITH OBSTRUCTION/LOWER URINARY TRACT SYMPTOMS: ICD-10-CM

## 2025-04-14 DIAGNOSIS — R97.20 ELEVATED PSA: Primary | ICD-10-CM

## 2025-04-14 PROCEDURE — 1123F ACP DISCUSS/DSCN MKR DOCD: CPT | Performed by: UROLOGY

## 2025-04-14 PROCEDURE — 99213 OFFICE O/P EST LOW 20 MIN: CPT | Performed by: UROLOGY

## 2025-04-14 ASSESSMENT — ENCOUNTER SYMPTOMS: SHORTNESS OF BREATH: 0

## 2025-04-14 NOTE — PROGRESS NOTES
[facility]  [address] [place] [id number]  [phone]    Cheko Patel  : 1950    Chief Complaint   Patient presents with    Follow-up        HPI     Cheko Patel is a 74 y.o. male   With elevated PSA, low risk prostate cancer diagnosed in , followed with AS., family history of prostate cancer  PSA  was 4.64 in -, 4.3 in -.   PSA up to 4.79 in   PSA 6.2 on 23.  Father and brother  of prostate cancer. 2 other brothers are alive with prostate cancer.   He denies gross hematuria. Has had microhematuria. LUTS include nocturia 2-3. No daytime sx. PVR is 0 cc via u/s.      He is a retired . Nonsmoker.     MRI pelvis 10-19-23: FINDINGS:  Prostate gland size: 6.3 cm transverse x 4.9 cm AP x 4.8 cm craniocaudal.  Peripheral zone:  Lesion 1: There is 0.6 cm lesion with focal homogeneous hypointensity on the ADC  map (series 550 image 13), located on the lateral left peripheral zone in the  mid gland at 3 o'clock to cm from the midline. There is associated  hyperintensity on the high b value diffusion-weighted sequence. There is  associated focal hypointensity on the T2 sequences. Analysis of the dynamic pre-  and postgadolinium kinetics demonstrates findings negative for focal, early  enhancement. PI-RADS classification: 3 (Intermediate probability for the  presence of clinically significant cancer).     Lesion 2: There is a 0.6 cm lesion with focal homogeneous hypointensity on the  ADC map (series 550 image 13), located on the posterior left peripheral zone in  the mid gland at 4 o'clock 1.5 cm from the midline. There is associated  hyperintensity on the high b value diffusion-weighted sequence. There is  associated focal hypointensity on the T2 sequences. Analysis of the dynamic pre-  and postgadolinium kinetics demonstrates findings positive for focal, early  enhancement. PI-RADS classification: 4 (high probability for the presence of  clinically